# Patient Record
(demographics unavailable — no encounter records)

---

## 2025-01-27 NOTE — PHYSICAL EXAM
[Healthy Appearing] : healthy appearing [Well Nourished] : well nourished [Interactive] : interactive [Overweight] : overweight [Normal Appearance] : normal appearance [Well formed] : well formed [Normally Set] : normally set [WNL for age] : within normal limits of age [Abdomen Soft] : soft [Abdomen Tenderness] : non-tender [] : no hepatosplenomegaly [2] : was Garett stage 2 [Normal for Age] : was normal for age [Scant] : scant [Normal] : normal  [Goiter] : no goiter [Enlarged Diffusely] : was not enlarged

## 2025-01-27 NOTE — REVIEW OF SYSTEMS
[Nl] : Neurological [Cold Intolerance] : no intolerance to cold [Heat Intolerance] : no intolerance to heat [Hair Symptoms] : no hair symptoms [Polydypsia] : no polydipsia [Polyuria] : no polyuria [Loss of Hair] : no hair loss

## 2025-01-27 NOTE — CONSULT LETTER
[Dear  ___] : Dear  [unfilled], [Consult Letter:] : I had the pleasure of evaluating your patient, [unfilled]. [Please see my note below.] : Please see my note below. [Consult Closing:] : Thank you very much for allowing me to participate in the care of this patient.  If you have any questions, please do not hesitate to contact me. [Sincerely,] : Sincerely, [FreeTextEntry3] : Inocencia Baca MD Pediatric Endocrinology [facility] [phone]

## 2025-01-27 NOTE — DISCUSSION/SUMMARY
[FreeTextEntry1] : Lianet is a 9y5mF who is presenting for initial endocrine consult for concerns of early puberty. In discussion with mother, noted to have breast development that began after 8th birthday (She thinks 8y3-6m and Pubic Hair after 9 years of age). Based on the report of when both breast development and pubic hair development began, these ages are considered normal. Mother presenting today as she is worried regarding period happening soon and wanted to ensure all was normal. On examination, noted to have Garett 2Br on the right and Garett 3Br on the left. I explained to mother that menarche usually occurs at Garett Stage 4 of which she has not yet hit, but may occur within the next 1-2 years. I also advised mother today that I am unsure how rapid her puberty will occur as the progression is unpredictable with some children going to puberty faster or slower than others. Given normal age of puberty starting, no labs are indicated at this time; however, will do BA to assess if BAPH is appropriate for MPTH.

## 2025-01-27 NOTE — ASSESSMENT
[FreeTextEntry1] : Lianet is a 9y5mF who is presenting for initial endocrine consult for concerns of early puberty.   Plan:  - Bone Age to be done - Mother to call once Bone Age has been done for me to review.  - Based on Bone Age height prediction, will determine follow up.   Inocencia Baca MD Pediatric Endocrinology [facility] [phone]

## 2025-01-27 NOTE — DATA REVIEWED
[FreeTextEntry1] : Growth chart that has been sent to me only has 2 points:  9/4/24: 55.5 inches 9/10/23: 51.9 inches   No labs to review.

## 2025-01-27 NOTE — HISTORY OF PRESENT ILLNESS
[Premenarchal] : premenarchal [FreeTextEntry2] : Lianet is a 9y5mF who is presenting for initial endocrine consult for concerns of early puberty.   Per mother, about 1 year ago (When she was about 8y3-6m), mother noticed breast development.  She denies rapid progression but does report some increase in tissue.  She denies menarche.  Patient, herself, reports one episode of clear vaginal discharge.   Pubarche:  Mother noticed pubic hair recently, within the last 2-3 months, after she turned 9 years old.  Denies axillary hair and body odor.   Exposures:  - Denies excessive soy exposure.  - Denies exposure to topical hormones at home.  - Denies exposure to lavendar/tea tree oils.   Maternal menarche; 11 years old Patient does not have any older sisters.  Maternal Aunt Menarche around 13-14 years old.  Mother denies family members with early or late puberty.   Height:  Family reports that patient has always been tall for her age, but feels that over the last year has grown more compared to previous years.  Growth chart that has been sent to me only has 2 points:  9/4/24: 55.5 inches 9/10/23: 51.9 inches   Family Height Mother: 67 inches Father: 70 inches  MPTH: 66.25 inches (+/-2 inches)  Symptoms: Denies headaches, blurry vision, nausea, vomiting, polyuria, polydipsia.